# Patient Record
Sex: FEMALE | Race: WHITE | NOT HISPANIC OR LATINO | URBAN - METROPOLITAN AREA
[De-identification: names, ages, dates, MRNs, and addresses within clinical notes are randomized per-mention and may not be internally consistent; named-entity substitution may affect disease eponyms.]

---

## 2019-06-22 ENCOUNTER — OFFICE VISIT (OUTPATIENT)
Dept: URGENT CARE | Facility: URGENT CARE | Age: 39
End: 2019-06-22
Payer: COMMERCIAL

## 2019-06-22 VITALS
DIASTOLIC BLOOD PRESSURE: 81 MMHG | HEIGHT: 63 IN | BODY MASS INDEX: 29.23 KG/M2 | RESPIRATION RATE: 18 BRPM | OXYGEN SATURATION: 98 % | WEIGHT: 165 LBS | TEMPERATURE: 99 F | SYSTOLIC BLOOD PRESSURE: 114 MMHG | HEART RATE: 75 BPM

## 2019-06-22 DIAGNOSIS — R30.0 DYSURIA: ICD-10-CM

## 2019-06-22 DIAGNOSIS — N39.0 ACUTE UTI: ICD-10-CM

## 2019-06-22 DIAGNOSIS — R22.0 SWOLLEN LIP: ICD-10-CM

## 2019-06-22 DIAGNOSIS — K13.0 INFECTION OF LIP: Primary | ICD-10-CM

## 2019-06-22 LAB
BASOPHILS # BLD AUTO: 0.04 10*3/UL
BASOPHILS NFR BLD AUTO: 0 % (ref 0–2)
BILIRUB UR QL: NEGATIVE
CLARITY UR: CLEAR
COLOR UR: YELLOW
EOSINOPHIL # BLD AUTO: 0.24 10*3/UL
EOSINOPHIL NFR BLD AUTO: 3 % (ref 0–5)
ERYTHROCYTE [DISTWIDTH] IN BLOOD BY AUTOMATED COUNT: 12.1 % (ref 11.6–14.8)
GLUCOSE UR QL: NEGATIVE MG/DL
HCT VFR BLD AUTO: 40.1 % (ref 37.7–47.9)
HGB BLD-MCNC: 13.1 G/DL (ref 12.1–16.2)
HGB UR QL: NEGATIVE
KETONES UR-MCNC: NEGATIVE MG/DL
LEUKOCYTE ESTERASE UR QL STRIP: ABNORMAL
LYMPHOCYTES # BLD AUTO: 2.19 10*3/UL
LYMPHOCYTES NFR BLD AUTO: 23 % (ref 13–40)
MCH RBC QN AUTO: 30.2 PG (ref 26–34)
MCHC RBC AUTO-ENTMCNC: 32.7 G/DL (ref 31–36)
MCV RBC AUTO: 92.5 FL (ref 80–100)
MONOCYTES # BLD AUTO: 0.77 10*3/UL
MONOCYTES NFR BLD AUTO: 8 % (ref 2–11)
NEUTROPHILS # BLD AUTO: 6.46 10*3/UL
NEUTROPHILS NFR BLD AUTO: 67 % (ref 47–80)
NITRITE UR QL: NEGATIVE
PH UR: 7 PH
PLATELET # BLD AUTO: 245 10*3/UL (ref 140–440)
PMV BLD AUTO: 6.9 FL (ref 6.9–10.8)
PROT UR STRIP-MCNC: NEGATIVE MG/DL
RBC # BLD AUTO: 4.33 10*6/ΜL (ref 4.05–5.48)
SP GR UR: 1.01 (ref 1–1.03)
UROBILINOGEN UR-MCNC: 0.2 E.U./DL
WBC # BLD AUTO: 9.7 10*3/UL (ref 4.1–10.9)

## 2019-06-22 PROCEDURE — 87088 URINE BACTERIA CULTURE: CPT | Performed by: PHYSICIAN ASSISTANT

## 2019-06-22 PROCEDURE — 99202 OFFICE O/P NEW SF 15 MIN: CPT | Performed by: PHYSICIAN ASSISTANT

## 2019-06-22 PROCEDURE — 81003 URINALYSIS AUTO W/O SCOPE: CPT | Performed by: PHYSICIAN ASSISTANT

## 2019-06-22 PROCEDURE — 36415 COLL VENOUS BLD VENIPUNCTURE: CPT | Performed by: PHYSICIAN ASSISTANT

## 2019-06-22 PROCEDURE — 85025 COMPLETE CBC W/AUTO DIFF WBC: CPT | Performed by: PHYSICIAN ASSISTANT

## 2019-06-22 RX ORDER — AMOXICILLIN AND CLAVULANATE POTASSIUM 875; 125 MG/1; MG/1
1 TABLET, FILM COATED ORAL 2 TIMES DAILY
Qty: 20 TABLET | Refills: 0 | Status: SHIPPED | OUTPATIENT
Start: 2019-06-22 | End: 2019-07-02

## 2019-06-22 RX ORDER — PHENTERMINE HYDROCHLORIDE 37.5 MG/1
TABLET ORAL
Refills: 0 | COMMUNITY
Start: 2019-05-02

## 2019-06-22 RX ORDER — HYDROXYZINE HYDROCHLORIDE 25 MG/1
TABLET, FILM COATED ORAL
Refills: 1 | COMMUNITY
Start: 2019-04-26

## 2019-06-22 RX ORDER — LAMOTRIGINE 25 MG/1
TABLET ORAL
Refills: 0 | COMMUNITY
Start: 2019-06-04

## 2019-06-22 ASSESSMENT — PAIN SCALES - GENERAL: PAINLEVEL: 9

## 2019-06-22 NOTE — PROGRESS NOTES
Clinic Note     SUBJECTIVE    Chief Complaint:  Chief Complaint   Patient presents with   • Pain     Patient presents to clinic c/o possible allergic reaction to something, unsure what - on Thursday - visited a museum in [place] and that is the only thing different.  Lips are swollen and painful, denies SOB, throat swelling, but her lips are very uncomfortable.  This am took atarax this morning.  Reports seasonal and environmental allergies, and is traveling from [place] to [place] and has been camping but does not think that is the problem       HPI:    Gonzalo Oconnor is a 38 y.o. female who presents for Pain (Patient presents to clinic c/o possible allergic reaction to something, unsure what - on Thursday - visited a museum in [place] and that is the only thing different.  Lips are swollen and painful, denies SOB, throat swelling, but her lips are very uncomfortable.  She states on Thursday there was a little blister on her lower lip that she popped and things have gotten worse since then.  This am took atarax.  Reports seasonal and environmental allergies, and is traveling from [place] to [place] and has been camping but does not think that is the problem)  .  She also then mentioned she believes she has a UTI because she is having dysuria.  Denies fever, chills, low back pain.    The following have been reviewed and updated as appropriate in this visit:         Allergies   Allergen Reactions   • Thimerosal      Current Outpatient Medications   Medication Sig Dispense Refill   • hydrOXYzine (ATARAX) 25 mg tablet   1   • lamoTRIgine (LaMICtal) 25 mg tablet TAKE 1 TABLET BY MOUTH ONCE DAILY FOR 5 DAYS AND THEN 2 TABLETS ONCE DAILY THEREAFTER.  0   • phentermine (ADIPEX-P) 37.5 mg tablet   0   • amoxicillin-pot clavulanate (AUGMENTIN) 875-125 mg per tablet Take 1 tablet by mouth 2 (two) times a day for 10 days 20 tablet 0   • magic mouthwash (maalox/prednisolone/diphenhydramine/lidocaine) Apply 5 mL to the mouth or throat every 4  "(four) hours as needed for stomatitis for up to 10 days 250 mL 0     No current facility-administered medications for this visit.      History reviewed. No pertinent past medical history.  Past Surgical History:   Procedure Laterality Date   • CHOLECYSTECTOMY       Family History   Problem Relation Age of Onset   • No Known Problems Mother    • No Known Problems Father      Social History     Socioeconomic History   • Marital status:      Spouse name: Not on file   • Number of children: Not on file   • Years of education: Not on file   • Highest education level: Not on file   Occupational History   • Not on file   Social Needs   • Financial resource strain: Not on file   • Food insecurity:     Worry: Not on file     Inability: Not on file   • Transportation needs:     Medical: Not on file     Non-medical: Not on file   Tobacco Use   • Smoking status: Never Smoker   • Smokeless tobacco: Never Used   Substance and Sexual Activity   • Alcohol use: Not on file   • Drug use: Not on file   • Sexual activity: Not on file   Lifestyle   • Physical activity:     Days per week: Not on file     Minutes per session: Not on file   • Stress: Not on file   Relationships   • Social connections:     Talks on phone: Not on file     Gets together: Not on file     Attends Gnosticist service: Not on file     Active member of club or organization: Not on file     Attends meetings of clubs or organizations: Not on file     Relationship status: Not on file   Other Topics Concern   • Not on file   Social History Narrative   • Not on file       Review of Systems:  Review of Systems   As per HPI    OBJECTIVE     Vitals:  /81   Pulse 75   Temp 37.2 °C (99 °F) (Temporal)   Resp 18   Ht 1.6 m (5' 3\")   Wt 74.8 kg (165 lb)   SpO2 98%   BMI 29.23 kg/m²     PE:  Physical Exam   Constitutional: She appears well-developed and well-nourished.   HENT:   Head: Normocephalic and atraumatic.   Right Ear: Tympanic membrane normal.   Left " Ear: Tympanic membrane normal.   Mouth/Throat: Uvula is midline. Posterior oropharyngeal erythema present.   Note swelling, pustules, crusting of her lower lip and slightly of the upper lip.  No current drainage.   Eyes: Pupils are equal, round, and reactive to light. EOM are normal.   Cardiovascular: Normal rate and regular rhythm.   Pulmonary/Chest: Effort normal.   Abdominal: Soft. There is no tenderness. There is no CVA tenderness.   Skin: Rash noted.   Nursing note and vitals reviewed.        Recent Results (from the past 12 hour(s))   CBC w/auto differential Blood, Venous    Collection Time: 06/22/19 12:24 PM   Result Value Ref Range    WBC 9.7 4.1 - 10.9 10*3/uL    RBC 4.33 4.05 - 5.48 10*6/µL    Hemoglobin 13.1 12.1 - 16.2 g/dL    Hematocrit 40.1 37.7 - 47.9 %    MCV 92.5 80.0 - 100.0 fL    MCH 30.2 26.0 - 34.0 pg    MCHC 32.7 31.0 - 36.0 g/dL    RDW 12.1 11.6 - 14.8 %    Platelets 245 140 - 440 10*3/uL    MPV 6.9 6.9 - 10.8 fL    Neutrophils% 67 47 - 80 %    Lymphocytes% 23 13 - 40 %    Monocytes% 8 2 - 11 %    Eosinophils% 3 0 - 5 %    Basophils% 0 0 - 2 %    Neutrophils Absolute 6.46 10*3/uL    Lymphocytes Absolute 2.19 10*3/uL    Monocytes Absolute 0.77 10*3/uL    Eosinophils Absolute 0.24 10*3/uL    Basophils Absolute 0.04 10*3/uL   Urinalysis, dipstick Urine, Clean Catch    Collection Time: 06/22/19 12:24 PM   Result Value Ref Range    Color, Urine Yellow Yellow    Clarity, Urine Clear Clear    Specific Gravity, Urine 1.010 1.003 - 1.030    Leukocytes, Urine Small (A) Negative    Nitrite, Urine Negative Negative    Protein, Urine Negative Negative mg/dL    Ketones, Urine Negative Negative mg/dL    Urobilinogen, Urine 0.2 <2.0 E.U./dL    Bilirubin, Urine Negative Negative    Blood, Urine Negative Negative    Glucose, Urine Negative Negative mg/dL    pH, Urine 7.0 5.0 - 8.0 PH         ASSESSMENT:    Gonzalo was seen today for pain.    Diagnoses and all orders for this visit:    Infection of lip  -     CBC  w/auto differential Blood, Venous  -     amoxicillin-pot clavulanate (AUGMENTIN) 875-125 mg per tablet; Take 1 tablet by mouth 2 (two) times a day for 10 days  -     magic mouthwash (maalox/prednisolone/diphenhydramine/lidocaine); Apply 5 mL to the mouth or throat every 4 (four) hours as needed for stomatitis for up to 10 days    Swollen lip  -     CBC w/auto differential Blood, Venous    Dysuria  -     Urinalysis, dipstick Urine, Clean Catch    Acute UTI  -     amoxicillin-pot clavulanate (AUGMENTIN) 875-125 mg per tablet; Take 1 tablet by mouth 2 (two) times a day for 10 days  -     Urine culture          PLAN  Discussed with patient that her CBC is normal and her urinalysis does indeed show an infection.  We will treat her with Augmentin to cover both UTI and the lip infection.  She requested something for pain so was given Magic mouthwash to use 4 times daily as needed.  Discussed will treat UTI with antibiotics, patient will take antibiotics until gone; encouraged to push fluids; if doesn't improve, patient is advised to return.  We will notify her the results of her urine culture when available.     Patient verbalizes understanding of above information and will contact [facility] with any further questions or concerns.    ALLAN CABRALES

## 2019-06-24 LAB — BACTERIA UR CULT: NORMAL

## 2019-06-28 ENCOUNTER — TRANSFERRED RECORDS (OUTPATIENT)
Dept: HEALTH INFORMATION MANAGEMENT | Facility: CLINIC | Age: 39
End: 2019-06-28

## 2019-06-28 ENCOUNTER — HOSPITAL ENCOUNTER (EMERGENCY)
Facility: CLINIC | Age: 39
Discharge: ANOTHER HOSPITAL | End: 2019-06-28
Attending: EMERGENCY MEDICINE | Admitting: EMERGENCY MEDICINE
Payer: COMMERCIAL

## 2019-06-28 VITALS
WEIGHT: 160 LBS | TEMPERATURE: 100.3 F | SYSTOLIC BLOOD PRESSURE: 100 MMHG | RESPIRATION RATE: 16 BRPM | DIASTOLIC BLOOD PRESSURE: 62 MMHG | HEART RATE: 66 BPM | OXYGEN SATURATION: 96 %

## 2019-06-28 DIAGNOSIS — Z88.9 DRUG HYPERSENSITIVITY: ICD-10-CM

## 2019-06-28 DIAGNOSIS — L51.9 ERYTHEMA MULTIFORME: ICD-10-CM

## 2019-06-28 LAB
ALBUMIN SERPL-MCNC: 3.6 G/DL (ref 3.4–5)
ALP SERPL-CCNC: 182 U/L (ref 40–150)
ALT SERPL W P-5'-P-CCNC: 824 U/L (ref 0–50)
ANION GAP SERPL CALCULATED.3IONS-SCNC: 10 MMOL/L (ref 3–14)
AST SERPL W P-5'-P-CCNC: 1385 U/L (ref 0–45)
BASOPHILS # BLD AUTO: 0 10E9/L (ref 0–0.2)
BASOPHILS NFR BLD AUTO: 0.3 %
BILIRUB SERPL-MCNC: 0.8 MG/DL (ref 0.2–1.3)
BUN SERPL-MCNC: 17 MG/DL (ref 7–30)
CALCIUM SERPL-MCNC: 8.3 MG/DL (ref 8.5–10.1)
CHLORIDE SERPL-SCNC: 104 MMOL/L (ref 94–109)
CO2 SERPL-SCNC: 24 MMOL/L (ref 20–32)
CREAT SERPL-MCNC: 0.77 MG/DL (ref 0.52–1.04)
DIFFERENTIAL METHOD BLD: ABNORMAL
EOSINOPHIL # BLD AUTO: 0.1 10E9/L (ref 0–0.7)
EOSINOPHIL NFR BLD AUTO: 0.6 %
ERYTHROCYTE [DISTWIDTH] IN BLOOD BY AUTOMATED COUNT: 12 % (ref 10–15)
GFR SERPL CREATININE-BSD FRML MDRD: >90 ML/MIN/{1.73_M2}
GLUCOSE SERPL-MCNC: 99 MG/DL (ref 70–99)
HCT VFR BLD AUTO: 43.4 % (ref 35–47)
HGB BLD-MCNC: 14 G/DL (ref 11.7–15.7)
IMM GRANULOCYTES # BLD: 0.1 10E9/L (ref 0–0.4)
IMM GRANULOCYTES NFR BLD: 0.5 %
LYMPHOCYTES # BLD AUTO: 0.7 10E9/L (ref 0.8–5.3)
LYMPHOCYTES NFR BLD AUTO: 5.5 %
MCH RBC QN AUTO: 30.4 PG (ref 26.5–33)
MCHC RBC AUTO-ENTMCNC: 32.3 G/DL (ref 31.5–36.5)
MCV RBC AUTO: 94 FL (ref 78–100)
MONOCYTES # BLD AUTO: 0.5 10E9/L (ref 0–1.3)
MONOCYTES NFR BLD AUTO: 3.7 %
NEUTROPHILS # BLD AUTO: 10.9 10E9/L (ref 1.6–8.3)
NEUTROPHILS NFR BLD AUTO: 89.4 %
NRBC # BLD AUTO: 0 10*3/UL
NRBC BLD AUTO-RTO: 0 /100
PLATELET # BLD AUTO: 292 10E9/L (ref 150–450)
POTASSIUM SERPL-SCNC: 3.8 MMOL/L (ref 3.4–5.3)
PROT SERPL-MCNC: 7.5 G/DL (ref 6.8–8.8)
RBC # BLD AUTO: 4.61 10E12/L (ref 3.8–5.2)
SODIUM SERPL-SCNC: 138 MMOL/L (ref 133–144)
WBC # BLD AUTO: 12.2 10E9/L (ref 4–11)

## 2019-06-28 PROCEDURE — 80053 COMPREHEN METABOLIC PANEL: CPT | Performed by: PHYSICIAN ASSISTANT

## 2019-06-28 PROCEDURE — 36415 COLL VENOUS BLD VENIPUNCTURE: CPT | Performed by: PHYSICIAN ASSISTANT

## 2019-06-28 PROCEDURE — 25000132 ZZH RX MED GY IP 250 OP 250 PS 637: Performed by: EMERGENCY MEDICINE

## 2019-06-28 PROCEDURE — 87040 BLOOD CULTURE FOR BACTERIA: CPT | Performed by: PHYSICIAN ASSISTANT

## 2019-06-28 PROCEDURE — 25000128 H RX IP 250 OP 636: Performed by: EMERGENCY MEDICINE

## 2019-06-28 PROCEDURE — 87529 HSV DNA AMP PROBE: CPT | Performed by: PHYSICIAN ASSISTANT

## 2019-06-28 PROCEDURE — 25800030 ZZH RX IP 258 OP 636: Performed by: PHYSICIAN ASSISTANT

## 2019-06-28 PROCEDURE — 25000128 H RX IP 250 OP 636: Performed by: PHYSICIAN ASSISTANT

## 2019-06-28 PROCEDURE — 86780 TREPONEMA PALLIDUM: CPT | Performed by: EMERGENCY MEDICINE

## 2019-06-28 PROCEDURE — 85025 COMPLETE CBC W/AUTO DIFF WBC: CPT | Performed by: PHYSICIAN ASSISTANT

## 2019-06-28 PROCEDURE — 80175 DRUG SCREEN QUAN LAMOTRIGINE: CPT | Performed by: PHYSICIAN ASSISTANT

## 2019-06-28 PROCEDURE — 86738 MYCOPLASMA ANTIBODY: CPT | Performed by: PHYSICIAN ASSISTANT

## 2019-06-28 PROCEDURE — 96374 THER/PROPH/DIAG INJ IV PUSH: CPT

## 2019-06-28 PROCEDURE — 96375 TX/PRO/DX INJ NEW DRUG ADDON: CPT

## 2019-06-28 PROCEDURE — 96361 HYDRATE IV INFUSION ADD-ON: CPT

## 2019-06-28 PROCEDURE — 99285 EMERGENCY DEPT VISIT HI MDM: CPT | Mod: 25

## 2019-06-28 RX ORDER — LAMOTRIGINE 100 MG/1
100 TABLET ORAL DAILY
COMMUNITY

## 2019-06-28 RX ORDER — SODIUM CHLORIDE 9 MG/ML
1000 INJECTION, SOLUTION INTRAVENOUS CONTINUOUS
Status: DISCONTINUED | OUTPATIENT
Start: 2019-06-28 | End: 2019-06-29 | Stop reason: HOSPADM

## 2019-06-28 RX ORDER — METHYLPREDNISOLONE SODIUM SUCCINATE 125 MG/2ML
125 INJECTION, POWDER, LYOPHILIZED, FOR SOLUTION INTRAMUSCULAR; INTRAVENOUS ONCE
Status: COMPLETED | OUTPATIENT
Start: 2019-06-28 | End: 2019-06-28

## 2019-06-28 RX ORDER — MORPHINE SULFATE 4 MG/ML
4 INJECTION, SOLUTION INTRAMUSCULAR; INTRAVENOUS
Status: DISCONTINUED | OUTPATIENT
Start: 2019-06-28 | End: 2019-06-29 | Stop reason: HOSPADM

## 2019-06-28 RX ADMIN — SODIUM CHLORIDE 1000 ML: 9 INJECTION, SOLUTION INTRAVENOUS at 19:28

## 2019-06-28 RX ADMIN — ACETAMINOPHEN 500 MG: 325 SOLUTION ORAL at 18:24

## 2019-06-28 RX ADMIN — METHYLPREDNISOLONE SODIUM SUCCINATE 125 MG: 125 INJECTION, POWDER, FOR SOLUTION INTRAMUSCULAR; INTRAVENOUS at 19:21

## 2019-06-28 RX ADMIN — SODIUM CHLORIDE 1000 ML: 9 INJECTION, SOLUTION INTRAVENOUS at 18:10

## 2019-06-28 RX ADMIN — MORPHINE SULFATE 4 MG: 4 INJECTION INTRAVENOUS at 18:10

## 2019-06-28 ASSESSMENT — ENCOUNTER SYMPTOMS
DIARRHEA: 1
VOMITING: 0
SHORTNESS OF BREATH: 1
ABDOMINAL PAIN: 1
NAUSEA: 1
EYE DISCHARGE: 1
EYE REDNESS: 1
TROUBLE SWALLOWING: 1

## 2019-06-28 NOTE — ED PROVIDER NOTES
Emergency Department Attending Supervision Note  6/28/2019  5:29 PM      I evaluated this patient in conjunction with Rick Haines PA-C.      Briefly, the patient presented with rash and mouth lesions x 8 days and worsening, 1 week of diarrhea nonbloody, 2 days of rash on palms and soles, and conjunctivitis x 2 days. Was camping when symptoms developed. No one else in her party developed same symptoms. Low grade fevers at home. Started lamotrigine 3 weeks ago for mood disorder.      On my exam, diffuse and open mouth sores and lips blisters, some hemorrhagic in appearance. Blisters inside the mouth as well. B conjunctivitis with chemosis. B erythematous and macularpapular rash on palms and soles. No rash anywhere else.    Results:see above      ED course:  1750 I spoke with and checked on the patient.   1850 I updated and checked on the patient.  1922 I checked on and updated the patient.   1950 I spoke to Dr. Hazel of the hospitalist service at [facility] who does not accept the patient for admission.   2049 I spoke [place]. Adamson of the [facility] who accepts the patient for admission.     My impression is a 38 year old patient that the patient presents with oral ulcers, lesions on her hand, and multiple other issues. For patients symptoms, please see the PA's note in regards to the course. The differentials include possible lamotrigine induced hypersensitivity versus a viral induced rash. I did discuss the pictures with dermatology at the [facility]. They do think she needs solumedrol, in case this was a drug sensitivity and he advise monitoring the patient closely and admitting her for IV fluids as well as pain control. Her liver enzymes they return with pictures of viral hepatitis which would certainly be more consistent with a viral infection rather than a serious drug reaction. The [facility] does not have any beds available, so we did try other hospitals and we obtained a [place] at [facility]. For this patient she  will be going to the ER first then placed in a bed after that. She is aware and I spoke with her family in detail as well as her daughter in law on the phone, who happens to be a physician, they area ll agreeable with the plan.         Diagnosis    ICD-10-CM    1. 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 Drug hypersensitivity Z88.9      Sandeep Iglesias MD Cheng, Wenlan, MD  06/28/19 6713

## 2019-06-28 NOTE — ED TRIAGE NOTES
Patient arrives with blistering and open wounds to both lips and inner gumline. Patient also presents with an erythemic rash to bilateral hands and feet. Patient states this began two weeks ago as as a blister inside her mouth and was given an unknown prescription. Since then, patient's condition has worsened. ABCs intact. Reports difficulty swallowing but not breathing.

## 2019-06-28 NOTE — ED PROVIDER NOTES
History     Chief Complaint:  Rash and Mouth Lesions    CINDY Castrejon is a 38 year old female who presents to the ED for evaluation of rash and mouth lesions. The patient notes that she was in [place] 8 days ago and she developed a lesion on the inside of her mouth and popped it. She noted that her blisters were worsening and went to  in [place], she reports that she had a viral infection and they prescribed amoxicillin. She notes that her symptoms were worsening and developed diarrhea and nausea and went to the clinic in [facility], 5 days ago. There, they did blood work and she reports that they told her to stop the amoxacillin. The patient then reports that she went to see an oral surgeon yesterday and was given a new mouth wash and that her blisters are viral. She notes that she was able to eat yesterday but that her right eye began to swell and thinks that rubbing her mouth and then her eye is why her eye is swollen and has thick discharge. She notes that she has had anal itching but no history of hemrroids. Here, she notes that 3 weeks ago she started on Lamotrigine and that she was also prescribed and taking prednisone 6 days ago as well. She states that she has upper abdominal pain, shortness of breath, nausea, diarrhea, rash on hands and feet, and difficulty swallowing. She denies vomiting or any previous history of this before. She denies any ill contacts. She states that she was camping for the week prior to onset of the sores. No one else on the trip developed the same lesions.    6/24/2019 at 12:22 pm at [facility] with Dr. Corona Whatley  WBC count 9.7   Hgb: 13.3  Neutrophils: 72.3   Rapid Strep (Lab): Negative  Prescribed: Valtrex 1gm; Prednisone 20 mg  Diagnosis: other hypertropic disorders of the skin      6/28/2019 at 3:56 pm at [facility] with Dr. Corona Whatley  WBC count: 9.7  Neutrophils: 72.3   Rapid Strep (Lab):  Negative  Diagnosis: other hypertropic disorders of the skin      Allergies:  Cobalt  Thimerosal     Medications:      lamoTRIgine (LAMICTAL) 100 MG tablet       Past Medical History:    No past medical history on file.    There are no active problems to display for this patient.       Past Surgical History:    No past surgical history on file.     Family History:    family history is not on file.    Social History:       PCP: No primary care provider on file.     Review of Systems   HENT: Positive for mouth sores and trouble swallowing.    Eyes: Positive for discharge and redness.   Respiratory: Positive for shortness of breath.    Gastrointestinal: Positive for abdominal pain, diarrhea and nausea. Negative for vomiting.   Skin: Positive for rash.   All other systems reviewed and are negative.        Physical Exam     Patient Vitals for the past 24 hrs:   BP Temp Temp src Pulse Heart Rate Resp SpO2 Weight   06/28/19 1958 108/67 -- -- 75 -- -- 98 % --   06/28/19 1819 -- -- -- -- -- -- -- 72.6 kg (160 lb)   06/28/19 1731 120/73 100.3  F (37.9  C) Temporal -- 74 16 98 % --        Physical Exam  Constitutional: Pleasant. Cooperative.   Eyes: Pupils equally round and reactive  HENT: Head is normal in appearance. Oropharynx notable for blistered lesions and edema around lips. Scattered lesions noted in posterior oropharynx as well. See below.  Cardiovascular: Regular rate and rhythm and without murmurs.  Respiratory: Normal respiratory effort, lungs are clear bilaterally.  GI: Abdomen is soft, non-tender, non-distended. No guarding, rebound, or rigidity.  Musculoskeletal: No asymmetry of the lower extremities, no tenderness to palpation.   Skin: Erythematous, blanching lesions to bilateral palms. See below.  Neurologic: Cranial nerves grossly intact, normal cognition, no focal deficits. Alert and oriented x 3.   Psychiatric: Normal affect.  Nursing notes and vital signs reviewed.                        Emergency  Department Course     Laboratory:  Labs Ordered and Resulted from Time of ED Arrival Up to the Time of Departure from the ED   CBC WITH PLATELETS DIFFERENTIAL - Abnormal; Notable for the following components:       Result Value    WBC 12.2 (*)     Absolute Neutrophil 10.9 (*)     Absolute Lymphocytes 0.7 (*)     All other components within normal limits   COMPREHENSIVE METABOLIC PANEL - Abnormal; Notable for the following components:    Calcium 8.3 (*)     Alkaline Phosphatase 182 (*)      (*)     AST 1,385 (*)     All other components within normal limits   LAMOTRIGINE LEVEL   MYCOPLASMA PNEUMONIA ABYS IGG AND IGM   TREPONEMA ABS W REFLEX TO RPR AND TITER   PERIPHERAL IV CATHETER   BLOOD CULTURE   BLOOD CULTURE   HSV 1 AND 2 DNA BY PCR      Interventions:  Medications   0.9% sodium chloride BOLUS (0 mLs Intravenous Stopped 6/28/19 1921)     Followed by   sodium chloride 0.9% infusion (1,000 mLs Intravenous New Bag 6/28/19 1928)   morphine (PF) injection 4 mg (4 mg Intravenous Given 6/28/19 1810)   acetaminophen (TYLENOL) solution 500 mg (500 mg Oral Given 6/28/19 1824)   methylPREDNISolone sodium succinate (solu-MEDROL) injection 125 mg (125 mg Intravenous Given 6/28/19 1921)        Emergency Department Course:  Past medical records, nursing notes, and vitals reviewed.  I performed an exam of the patient and obtained history, as documented above.  I ordered the above labs.  Dr. Iglesias spoke with dermatology regarding the patient's presentation. Dermatology recommended transfer to [facility] in light of elevated LFTs, symptomatic control, unclear etiology.   Dr. Iglesias spoke with [facility].  Patient transferred to [facility] for ultimate admission for further work up and management.    Impression & Plan      DDx: Viral, SJS, EM, Bechet's syndrome    Medical Decision Making:  Faisal Castrejon is a 38 year old female who presents today for evaluation of mouth lesions and rash.  The patient states that she developed a lesion inside  of her mouth 8 days ago and popped it.  She then developed more lesions inside of her mouth and increased oral swelling 7 days ago so she presented to urgent care 6 days ago for evaluation and was prescribed amoxicillin and prednisone.  Her symptoms persisted, so she had a work-up at [facility] 5 days ago, was started on Valtrex, and then 1 day ago she was seen by an oral surgeon, who thought that it was likely viral in etiology, and prescribed her Magic mouthwash and advised her to stop the amoxicillin.  She states that last night she developed conjunctival swelling and erythema as well as rash to her bilateral palms and soles of her feet, with ongoing oral lesions, prompting her to present to the ED today.  She has not had any fevers.  Patient was started on lamotrigine 3 weeks ago for mood issues.  See [facility] [place] for additional details.  On initial evaluation, the patient has a temperature of 100.3F.  Other vital signs are within normal limits.  Physical exam is as above.  See pictures above.  The above differential was considered.  The above work-up was pursued.  CBC is remarkable for leukocytosis of 12.2.  CMP reveals elevated LFTs.  Patient provided 1 L of NS, morphine for pain along with Tylenol, and Solu-Medrol.  Dr. Iglesias spoke with dermatology regarding the patient's presentation and multiple etiologies were considered.  Ultimately, dermatology recommended transfer for inpatient evaluation and management.  Additional labs were recommended, including lamotrigine level, mycoplasma pneumonia, HSV, and syphilis.  Dr. Iglesias initially spoke with [facility] regarding this admission, however no beds were available.  Dr. Iglesias and spoke with [facility], who will accept the patient to the ED for ultimate admission for further work-up and management.    Diagnosis:    ICD-10-CM    1. 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 Drug hypersensitivity Z88.9        Rick Haines PA-C  [facility] ED  June 28, 2019          Rick Haines PA-C  06/28/19 [id number]

## 2019-06-29 LAB — T PALLIDUM AB SER QL: NONREACTIVE

## 2019-06-29 NOTE — ED NOTES
Patient updated on plan of care and ETA for ambulance. Verbalizes understanding and plans to contact family.

## 2019-06-29 NOTE — PROGRESS NOTES
[facility]  Transfer Triage Note  46-year-old female otherwise healthy recently started on lamotrigine 3 weeks ago presented today to outside ED with mild lesion blisters and rash.  Patient has been camping, but did not endorse any bites.  Dermatology was consulted over the phone concern for drug rash versus viral etiology.  ?  Patient has received 125 mg of Solu-Medrol at [facility].  Patient also was found to have acute transaminitis.  Patient will be transferring to the /[facility] for further work-up and dermatology and infectious disease consultation.  Recommended RPR and HIV testing,    Date of call: 6/28/2019  Time of call: 8:00      Reason for Transfer: Dermatology and ID consult     Tank Carpenter MD  Hospitalist/lukas  [facility]    Departments of Medicine   Pager: [phone]

## 2019-06-30 LAB
LAMOTRIGINE SERPL-MCNC: 1.7 UG/ML (ref 2.5–15)
M PNEUMO IGG SER IA-ACNC: 0.17 U/L
M PNEUMO IGM SER IA-ACNC: 0.22 U/L

## 2019-07-01 LAB
HSV1 DNA SPEC QL NAA+PROBE: NEGATIVE
HSV2 DNA SPEC QL NAA+PROBE: NEGATIVE
SPECIMEN SOURCE: NORMAL

## 2019-07-01 NOTE — RESULT ENCOUNTER NOTE
Final result for HSV 1 and 2 DNA by PCR is NEGATIVE.  No treatment or change in treatment per [place] ED Lab Result protocol

## 2019-07-04 LAB
BACTERIA SPEC CULT: NO GROWTH
BACTERIA SPEC CULT: NO GROWTH
Lab: NORMAL
Lab: NORMAL
SPECIMEN SOURCE: NORMAL
SPECIMEN SOURCE: NORMAL